# Patient Record
(demographics unavailable — no encounter records)

---

## 2025-05-24 NOTE — ASSESSMENT
[FreeTextEntry1] : Pt is a 52F w/ PMHx of HTN (non-complaint on meds) D&C, miscarriages (x2-last in 2024), presented to the ED w/ SOB for the past 2 weeks, worsening on exertion. Admitted to ICU for further management of severe symptomatic anemia, sub massive PE and DVT. Pt now home with home care. Taking Eliquis - denies any bleeding.  TCM NP reviewed that pt is under the [facility] program for home care until pt is seen by PCP. TCM NP will be assigned to sign Home Care orders post-discharge.

## 2025-05-24 NOTE — ASSESSMENT
[FreeTextEntry1] : Pt is a 52F w/ PMHx of HTN (non-complaint on meds) D&C, miscarriages (x2-last in 2024), presented to the ED w/ SOB for the past 2 weeks, worsening on exertion. Admitted to ICU for further management of severe symptomatic anemia, sub massive PE and DVT. Pt now home with home care. Taking Eliquis - denies any bleeding.  TCM NP reviewed that pt is under the [facility] program for home care until pt is seen by PCP. TCM NP will be assigned to sign Home Care orders post-discharge.

## 2025-05-24 NOTE — PLAN
[FreeTextEntry1] : 1. continue all medications as prescribed 2. maintain adequate nutrition and hydration - increase intake of green leafy vegetables 3. keep f/u with HEME/ONC, PCP 5/23; GYN, NEURO;

## 2025-05-24 NOTE — CURRENT MEDS
[Takes medication as prescribed] : takes [None] : Patient does not have any barriers to medication adherence [Yes] : Reviewed medication list for presence of high-risk medications. [Blood Thinners] : blood thinners [FreeTextEntry1] : Eliquis - denies any bleeding

## 2025-05-24 NOTE — HISTORY OF PRESENT ILLNESS
[Home] : at home, [unfilled] , at the time of the visit. [Other Location: e.g. Home (Enter Location, City,State)___] : at [unfilled] [Telehealth (audio & video)] : This visit was provided via telehealth using real-time 2-way audio visual technology. [Verbal consent obtained from patient] : the patient, [unfilled] [This encounter was initiated by telehealth (audio with video) and converted to telephone (audio only)] : This encounter was initiated by telehealth (audio with video) and converted to telephone (audio only) [Other:___] : [place] [FreeTextEntry1] : F/u post hospitalization at [facility] from for [de-identified] : This patient is Enrolled in the Post-Discharge [facility] Home Care Services Follow Up Program through [facility] for Continuity of Care S/P Recent Hospitalization until seen by PCP.  Pt is a 52F w/ PMHx of HTN (non-compliant with meds), reports a self-diagnosed hx of POTS, presented to the ED w/ SOB for the past 2 weeks, worse on exertion, with associated dizziness, and lethargy x 6 months. Pt had been attributing it to her POTS, so decided not to seek medical attention. Found to be profoundly anemic, Hgb 2.5. She had not seen her PCP recently (unsure when her last visit was). Pt endorsed hx of 2 miscarriages s/p d&c in 2004, reported last menses 2 years ago. Denied vaginal bleeding, hematuria, melena, hematochezia, hematemesis, hemoptysis. Denied family history of cancers, anemias, coagulopathies to her knowledge.  CTA revealed: extensive segmental PEs with associated mild right heart strain as well a Left femoral DVT. Incidental finding of right adnexal mass was noted.  She was admitted to ICU for further management of severe symptomatic anemia and acute PE/DVT. PESI score 72, Class II, Low Risk: 1.7-3.5% 30-day mortality in this group. PERT team consulted 5/6 and deemed no indication for thrombectomy. A formal TTE 5/6 showed EF 57%, Mildly enlarged right ventricular cavity size, with normal wall thickness, and normal right ventricular systolic function. Estimated pulmonary artery systolic pressure is 36 mmHg, consistent with borderline pulmonary hypertension. In ICU patient transfused 6 PRBC, 1FFP, 1 PLT with diuresis between products. Heme/Onc was consulted.  Hgb responded but inappropriately, peaked at 8.4 then down trended to 7.3.  Anemia work-up revealed some evidence of hemolysis with elevated bili and LDH and normal retic count.  FD AC was initiated with heparin.   Televisit converted to telephone visit due to loss of video. Pt is alert and oriented x 3. Lives alone. States she feels much better, however still fatigued. She ambulates independently indoors, has not been outside as yet. Denies CP, SOB, vision changes, nausea, vomiting, diarrhea, constipation, falls, edema. Pt reports she has all medications and is taking them. Reports appetite is improving, instructed her to have small frequent meals. 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 Mother is now in a nursing home in [facility] and has not been able to see her. She is grieving the loss of not having a family to care for her mom and also not seeing her and also her own health issues. Encouraged her to speak with her PCP for referral to counselling. Will also ask SW to provide pt with resources.  Encouraged pt to do all f/u with medical providers to get results especially for lung and breast mass. She agreed.

## 2025-05-24 NOTE — PHYSICAL EXAM
[No Acute Distress] : no acute distress [Normal Sclera/Conjunctiva] : normal sclera/conjunctiva [Normal Outer Ear/Nose] : the outer ears and nose were normal in appearance [No JVD] : no jugular venous distention [No Respiratory Distress] : no respiratory distress  [No Accessory Muscle Use] : no accessory muscle use [Normal Rate] : normal rate  [No Edema] : there was no peripheral edema [No Rash] : no rash [No Skin Lesions] : no skin lesions [Coordination Grossly Intact] : coordination grossly intact [Normal Gait] : normal gait [Normal] : affect was normal and insight and judgment were intact [de-identified] : bruising  due to IV/blood draws

## 2025-05-24 NOTE — HISTORY OF PRESENT ILLNESS
[Home] : at home, [unfilled] , at the time of the visit. [Other Location: e.g. Home (Enter Location, City,State)___] : at [unfilled] [Telehealth (audio & video)] : This visit was provided via telehealth using real-time 2-way audio visual technology. [Verbal consent obtained from patient] : the patient, [unfilled] [This encounter was initiated by telehealth (audio with video) and converted to telephone (audio only)] : This encounter was initiated by telehealth (audio with video) and converted to telephone (audio only) [Other:___] : [place] [FreeTextEntry1] : [facility] [de-identified] : This patient is Enrolled in the Post-Discharge [facility] Home Care Services Follow Up Program through [facility] for Continuity of Care S/P Recent Hospitalization until seen by PCP.  Pt is a 52F w/ PMHx of HTN (non-compliant with meds), reports a self-diagnosed hx of POTS, presented to the ED w/ SOB for the past 2 weeks, worse on exertion, with associated dizziness, and lethargy x 6 months. Pt had been attributing it to her POTS, so decided not to seek medical attention. Found to be profoundly anemic, Hgb 2.5. She had not seen her PCP recently (unsure when her last visit was). Pt endorsed hx of 2 miscarriages s/p d&c in 2004, reported last menses 2 years ago. Denied vaginal bleeding, hematuria, melena, hematochezia, hematemesis, hemoptysis. Denied family history of cancers, anemias, coagulopathies to her knowledge.  CTA revealed: extensive segmental PEs with associated mild right heart strain as well a Left femoral DVT. Incidental finding of right adnexal mass was noted.  She was admitted to ICU for further management of severe symptomatic anemia and acute PE/DVT. PESI score 72, Class II, Low Risk: 1.7-3.5% 30-day mortality in this group. PERT team consulted 5/6 and deemed no indication for thrombectomy. A formal TTE 5/6 showed EF 57%, Mildly enlarged right ventricular cavity size, with normal wall thickness, and normal right ventricular systolic function. Estimated pulmonary artery systolic pressure is 36 mmHg, consistent with borderline pulmonary hypertension. In ICU patient transfused 6 PRBC, 1FFP, 1 PLT with diuresis between products. Heme/Onc was consulted.  Hgb responded but inappropriately, peaked at 8.4 then down trended to 7.3.  Anemia work-up revealed some evidence of hemolysis with elevated bili and LDH and normal retic count.  FD AC was initiated with heparin.   Televisit converted to telephone visit due to loss of video. Pt is alert and oriented x 3. Lives alone. States she feels much better, however still fatigued. She ambulates independently indoors, has not been outside as yet. Denies CP, SOB, vision changes, nausea, vomiting, diarrhea, constipation, falls, edema. Pt reports she has all medications and is taking them. Reports appetite is improving, instructed her to have small frequent meals. 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 Mother is now in a nursing home in [facility] and has not been able to see her. She is grieving the loss of not having a family to care for her mom and also not seeing her and also her own health issues. Encouraged her to speak with her PCP for referral to counselling. Will also ask SW to provide pt with resources.  Encouraged pt to do all f/u with medical providers to get results especially for lung and breast mass. She agreed.

## 2025-05-24 NOTE — REVIEW OF SYSTEMS
[Incontinence] : incontinence [Muscle Weakness] : muscle weakness [Fever] : no fever [Chills] : no chills [Fatigue] : no fatigue [Hearing Loss] : no hearing loss [Shortness Of Breath] : no shortness of breath [Cough] : no cough [Abdominal Pain] : no abdominal pain [Nausea] : no nausea [Constipation] : no constipation [Vomiting] : no vomiting [Dysuria] : no dysuria [Hematuria] : no hematuria [Joint Pain] : no joint pain [Joint Stiffness] : no joint stiffness [Joint Swelling] : no joint swelling [Muscle Pain] : no muscle pain [Back Pain] : no back pain [Itching] : no itching [Skin Rash] : no skin rash [Unsteady Walking] : no ataxia [Insomnia] : no insomnia [Easy Bleeding] : no easy bleeding [Easy Bruising] : no easy bruising

## 2025-05-24 NOTE — PHYSICAL EXAM
[No Acute Distress] : no acute distress [Normal Sclera/Conjunctiva] : normal sclera/conjunctiva [Normal Outer Ear/Nose] : the outer ears and nose were normal in appearance [No JVD] : no jugular venous distention [No Respiratory Distress] : no respiratory distress  [No Accessory Muscle Use] : no accessory muscle use [Normal Rate] : normal rate  [No Edema] : there was no peripheral edema [No Rash] : no rash [No Skin Lesions] : no skin lesions [Coordination Grossly Intact] : coordination grossly intact [Normal Gait] : normal gait [Normal] : affect was normal and insight and judgment were intact [de-identified] : bruising  due to IV/blood draws

## 2025-06-03 NOTE — PHYSICAL EXAM
[Chaperoned Physical Exam] : A chaperone was present in the examining room during all aspects of the physical examination. [MA] : MA [Appropriately responsive] : appropriately responsive [Alert] : alert [No Acute Distress] : no acute distress [No Lymphadenopathy] : no lymphadenopathy [Regular Rate Rhythm] : regular rate rhythm [No Murmurs] : no murmurs [Clear to Auscultation B/L] : clear to auscultation bilaterally [Soft] : soft [Non-tender] : non-tender [Non-distended] : non-distended [No HSM] : No HSM [No Lesions] : no lesions [No Mass] : no mass [Oriented x3] : oriented x3 [FreeTextEntry2] : (+) B/L Ptosis [Examination Of The Breasts] : a normal appearance [No Masses] : no breast masses were palpable [Labia Majora] : normal [Labia Minora] : normal [Normal] : normal [Uterine Adnexae] : normal [FreeTextEntry1] : (+)Vaginal cyst ~ 3 cm on R-vaginal wall, mobile, non-tender, pt aware of findings, states she has had it for > 10years, asymptomatic

## 2025-06-03 NOTE — PLAN
[FreeTextEntry1] : Annual gyn exam - Pap/HPV/STD sent; Subacute Vaginitis - Aptima sent - treat based on results, pt aware; Annual screening mammogram referral given today.   (+) R-Vaginal wall cyst - asymptomatic, no surgical management needed currently.   History of ~ 2 cm R-Ovarian Cyst / 6 mm endometrial lining - asymptomatic repeat Pelvic U/S 1 year or earlier or if experiencing vaginal bleeding.   History of Bladder dysfunction - Daily Kegel's exercise reviewed / Reduce caffeine intake / regular bladder training by voiding every 2 hours counseled if symptoms worsen will refer to Urogyn for evaluation.   (+) Postmenopausal - No hx/o postmenopausal bleeding since (08/2023) / Counseling provided / Healthy dietary lifestyle modifications / Daily weight bearing exercises / Daily Yoga stretches and Mental Health exercises reviewed;  RTO for results review if abnormal or else f/u in one year.

## 2025-06-03 NOTE — HISTORY OF PRESENT ILLNESS
[Patient reported PAP Smear was normal] : Patient reported PAP Smear was normal [postmenopausal] : postmenopausal [N] : Patient does not use contraception [Y] : Positive pregnancy history [Post-Menopause, No Sxs] : post-menopausal, currently without symptoms [FreeTextEntry1] : Annual Gyn exam  Complaining of bladder dysfunction - bladder retraining reviewed, decrease caffeine intake, Kegel's  Postmenopausal since age 50.  Denies history of postmenopausal bleeding.  Last Pap> 5 years ago  Last Mammogram (~ 2 years ago); Requesting annual screening referral today.   / s/p STOP x 2   Single / Living alone / Unemployed, used to work as a teacher in [place]  Recently admitted to [facility] for management of anemia w/Hgb~2 - no vaginal bleeding, s/p Pelvic U/S - incidental finding ~ 2 cm R-simple cyst / 6mm endometrial lining / no vaginal bleeding / (+)DVT of Left Femoral vein -currently on Eliquis.      Annual gyn exam - Pap/HPV/STD sent / Pap smear up to date; STD screen - Pt requested STD blood screen today; Acute / Subacute Vaginitis - Aptima sent - treat based on results, pt aware; (+) Urinary symptoms - Urine Culture sent - treat based on results, pt aware; Annual screening Mammogram referral given or up to date; History of Irregular cycles / Pelvic pain - Hormone  panel / Health maintenance panel ordered today / Referral for Pelvic U/S given; Contraception counseling provided - Rx Depo w/ Calcium and Vitamin D daily sent to pharmacy / Rx Apri - 11 refills sent to pharmacy; History of Bladder dysfunction - Urogynecology consult referral provided today / Daily Kegel's exercise reviewed; (+) Postmenopausal - Counseling provided / Healthy dietary lifestyle modifications / Daily weight bearing exercises / Daily Yoga stretches and Mental Health exercises reviewed;  RTO for results review.  [TextBox_4] : ER follow up, cyst found on ultrasound [TextBox_19] : never done [PapSmeardate] : 2018 [TextBox_31] : done in [place] [PGHxTotal] : 2 [PGHxFullTerm] : 0 [PGHxPremature] : 0 [PGHxAbortions] : 0 [[facility]] : 0 [PGHxABInduced] : 0 [PGHxABSpont] : 2 [PGHxEctopic] : 0 [PGHxMultBirths] : 0

## 2025-06-09 NOTE — PHYSICAL EXAM
[Well Developed] : well developed [Well Nourished] : well nourished [No Acute Distress] : no acute distress [Normal Conjunctiva] : normal conjunctiva [Normal Venous Pressure] : normal venous pressure [No Carotid Bruit] : no carotid bruit [Normal S1, S2] : normal S1, S2 [No Murmur] : no murmur [No Rub] : no rub [No Gallop] : no gallop [Clear Lung Fields] : clear lung fields [Good Air Entry] : good air entry [No Respiratory Distress] : no respiratory distress  [Soft] : abdomen soft [Non Tender] : non-tender [No Masses/organomegaly] : no masses/organomegaly [Normal Bowel Sounds] : normal bowel sounds [Normal Gait] : normal gait [No Cyanosis] : no cyanosis [No Clubbing] : no clubbing [No Varicosities] : no varicosities [No Rash] : no rash [No Skin Lesions] : no skin lesions [Moves all extremities] : moves all extremities [Normal Speech] : normal speech [No Focal Deficits] : no focal deficits [Alert and Oriented] : alert and oriented [Normal memory] : normal memory

## 2025-07-02 NOTE — DISCUSSION/SUMMARY
[Antithrombotic therapy with ___] : antithrombotic therapy with  [unfilled] [Intensive Blood Pressure Control] : intensive blood pressure control [Lipid Lowering Therapy] : lipid lowering therapy [Patient encouraged to discuss with Primary MD] : I encouraged the patient to discuss these important issues with ~his/her~ primary care doctor [Goals and Counseling] : I have reviewed the goals of stroke risk factor modification. I counseled the patient on measures to reduce stroke risk, including the importance of medication compliance, risk factor control, exercise, healthy diet and avoidance of smoking. I reviewed stroke warning signs and symptoms and appropriate actions to take if such occur. [FreeTextEntry1] : From hospital summary:  Mrs. Mason is a 51 yo woman w/ PMH of HTN, Multiple Miscarriages, P/W SOB and SHARPE X 2weeks, found to be severely anemic w/Hb of 2.5, w/ evidence of numerous venous thrombosis including submassive PE, femoral DVT. Workup of psychotic presentation revealing a region of T2/FLAIR changes that are diffusion restricting and enhancing as well as punctate focus in L CR that is diff restricting but not clearly enhancing. She had since returned to baseline mental status after single day of IVMP. Suspect behavior was more likely delirium related iso anemia and metabolic derangements Patient has effectively been pan scanned and there are several nodules of unclear significance including 2.3 adnexal cystic mass w/ trace pelvic fluid, 1.3 cm NENA nodule, and 1.3 cm R thyroid lobe nodule. LP results were negative   Radiographically the differential for the brain lesions include venous infarct, watershed infarcts, small metastasis w/ surrounding vasogenic edema, and encephalitis. The largest R frontal lesion has T2 hyperintensity with irregular borders and satellite foci. It does not look very typical of encephalitis lesions which usually have softer borders, more confluent and widespread, and without such distinct satellite foci.  I would not expect patient's drastic improvement to occur after a single dose of solumedrol if this were paraneoplastic encephalitis or infectious encephalitis. Patient's MRI is motion degraded, particularly the contrast sequences.  The more likely possibilities are 1) Watershed infarcts in setting of life threatening anemia 2) Venous infarct, particularly given clinical context of multiple venous thromboses peripherally 3) small metastases with significant vasogenic edema (seems less likely but not excluded, no clear regional mass effect)  I have ordered repeat MRI head w/wo, MRA/MRV head w/wo to evaluate for infarcts vs. mets. Patient does not have significant traditional stroke risk factors and presumed infarcts related to severe anemia vs underlying hypercoagulability. Continue with AC in setting of PE and DVT as recommended by hematology. Continue with GYN follow up with for adnexal cystic mass, Pulmonary for pulm lesion, and PCP/endo for thyroid nodule. Neuro ophthalmology referral placed for evaluation chronic ptosis. She is interested in follow up with rheumatology to screen for autoimmune issues that could have caused her chronic pstosis   Signs of stroke discussed in detail    Consult note and abnormal hospital labs sent to: Dr. Chinchilla (hematology)

## 2025-07-02 NOTE — PHYSICAL EXAM
[FreeTextEntry1] : Neurological (>12):  MS: Awake, alert.  Oriented person place situation year month. Follows simple complex cross commands. Able to ID 3/3 objects. Attends to examiner Language: Speech is clear, fluent, good repetition,  comprehension, registration of words. CNs: PERRL (R 3mm, L 3mm). VFF. Significant bilateral ptosis which patient says is chronic. EOMI. No disconjugate gaze, nystagmus. V1-3 intact LT, No facial asymmetry b/l. Hearing grossly normal b/l. Tongue midline and can move side to side.   Motor - Normal bulk and tone throughout. No pronator drift.  L/R (out of 5 each)       Deltoid  5/5    Biceps   5/5      Triceps  5/5         Wrist Extension 5/5   Wrist Flexion  5/5   Interossei 5/5     5/5  L/R (out of 5 each)       Hip Flexion  5/5    Hip Extension  5/5  Knee Extension  5/5  Dorsiflexion  5/5      Plantar Flexion 5/5   Sensation: Intact to LT b/l x4 extremities.  Reflexes L/R:  intact  Toes: mute b/l no ankle clonus Coordination: No dysmetria to FTN b/l UE Gait: slow and hesitant gait

## 2025-07-02 NOTE — HISTORY OF PRESENT ILLNESS
[FreeTextEntry1] : Ms. Mason is a 52 year old right handed female PMHx HTN (non-compliant with meds), reports a self-diagnosed hx of POTS, chronic ptosis who presents today for post hospitalization follow up after a multiple infarcts on 5/5/2025  (From hospital discharge)  Pt is a 52F presented to the ED w/ SOB for the past 2 weeks, worse on exertion, with associated dizziness, and lethargy x 6 months. Pt had been attributing it to her POTS, so decided not to seek medical attention. Found to be profoundly anemic, Hgb 2.5. She had not seen her PCP recently (unsure when her last visit was). Pt endorsed hx of 2 miscarriages s/p d&c in 2004, reported last menses 2 years ago. Denied bleeding hx. Denied family history of cancers, anemias, coagulopathies to her knowledge.  CTA revealed: extensive segmental PEs with associated mild right heart strain as well a Left femoral DVT. Incidental finding of right adnexal mass was noted.  She was admitted to ICU for further management of severe symptomatic anemia and acute PE/DVT. PESI score 72, Class II, Low Risk: 1.7-3.5% 30-day mortality in this group. PERT team consulted 5/6 and deemed no indication for thrombectomy. A formal TTE 5/6 showed EF 57%. In ICU patient transfused 6 PRBC, 1FFP, 1 PLT with diuresis between products. Anemia work-up revealed some evidence of hemolysis with elevated bili and LDH and normal retic count.  FD AC was initiated with heparin.  Course was complicated by episode of severe agitation with disorganized, paranoid, delusional thoughts on the morning of 5/7.   Pt was unable to be re-directed and was medicated with Haldol. Pt continued to exhibit with disorganized, paranoid, delusional thoughts with tangential speech.  Pt's brother shared that pt has had history of eccentric behavior with emotional lability but denies any known psychiatric diagnosis. MR brain showed Right frontal lesion involving cerebral cortex and underlying white matter with enhancement consistent with encephalitis vs subacute infarction and tumor. A left mid centrum semiovale lesion consistent with acute infarction was also noted. Brain lesions likely watershed infarct iso life threatening anemia vs venous infarct iso multiple venous thromboses vs small metastasis with significant vasogenic edema. Less likely paraneoplastic encephalitis given drastic improvement in patient's "delirium" after treatment with methylprednisolone 1g qD x 3 days.  At this time, AC was changed to FD Lovenox and autoimmune encephalopathy eval and paraneoplastic antibodies were also ordered.  Given right adenexal mass, HCG and AFP tumor markers were also ordered and were negative. 24h EEG negative. Patient continued to have low hgb without any active signs of bleed and patient required an addition 2uprbc. Neurology performed an LP on 5/12 and collected: Cell count x2 in tubes 1 and protein, glucose, PCR panel (neg), Bacterial gram stain and culture (neg). Paraneoplastic encephalitis panel (ENC2), Cytology, Flow cytometry was negative. IgG index 0.6   Patient was resumed on FD Lovenox and hgb closely monitored.  GI consulted and patient underwent EGD/colonoscopy which showed erythema and ulceration in the distal sigmoid colon which was biopsied and Grade/Stage III internal and external hemorrhoids. Hypercoagulable work-up was sent, including genetic testing. Prothrombin and Factor V, and antithrombin III were negative. Hgb electrophoresis, flow cytometry, and APLS were negative. Porphyria w/u showed only slightly elevated levels, likely d/t FREDI. Patient to be transitioned to DOAC and follow outpatient with Dr. Abernathy (hematology)  An US pelvis & transvaginal confirmed a 2.1 rt ovarian cyst with mod pelvic free fluid. GYN was consulted and recommended outpatient follow up in 4-6 weeks repeat scan. For the 1.3 cm partly solid ground glass density nodule in the left upper lobe, heme onc recommended eventual follow up outpatient with repeat imaging in 3 months. Patient also to follow up outpatient for the 1.3 cm nonspecific hypodense lesion in the right lobe of the thyroid.  Course was complicated by code sepsis as patient was febrile 101.3 with . Patient had a negative lactate, UA, and clear Xray. Patient was found to be COVID positive.   7/1/2025 Today she reports feeling well and denies any residual neurological deficit. She has chronic BL ptosis x 8 years. She developed it while living in [place] and reports being tested for myasthenia gravis which was negative. Was also told it could be due to Sjogren's but was negative tested. She is on Eliquis BID for PE and DVT and being followed closely by hematology. Has started Iron supplements. Saw cardiology and PCP.

## 2025-07-02 NOTE — DISCUSSION/SUMMARY
[Antithrombotic therapy with ___] : antithrombotic therapy with  [unfilled] [Intensive Blood Pressure Control] : intensive blood pressure control [Lipid Lowering Therapy] : lipid lowering therapy [Patient encouraged to discuss with Primary MD] : I encouraged the patient to discuss these important issues with ~his/her~ primary care doctor [Goals and Counseling] : I have reviewed the goals of stroke risk factor modification. I counseled the patient on measures to reduce stroke risk, including the importance of medication compliance, risk factor control, exercise, healthy diet and avoidance of smoking. I reviewed stroke warning signs and symptoms and appropriate actions to take if such occur. [FreeTextEntry1] : From hospital summary:  Mrs. Mason is a 53 yo woman w/ PMH of HTN, Multiple Miscarriages, P/W SOB and SHARPE X 2weeks, found to be severely anemic w/Hb of 2.5, w/ evidence of numerous venous thrombosis including submassive PE, femoral DVT. Workup of psychotic presentation revealing a region of T2/FLAIR changes that are diffusion restricting and enhancing as well as punctate focus in L CR that is diff restricting but not clearly enhancing. She had since returned to baseline mental status after single day of IVMP. Suspect behavior was more likely delirium related iso anemia and metabolic derangements Patient has effectively been pan scanned and there are several nodules of unclear significance including 2.3 adnexal cystic mass w/ trace pelvic fluid, 1.3 cm NENA nodule, and 1.3 cm R thyroid lobe nodule. LP results were negative   Radiographically the differential for the brain lesions include venous infarct, watershed infarcts, small metastasis w/ surrounding vasogenic edema, and encephalitis. The largest R frontal lesion has T2 hyperintensity with irregular borders and satellite foci. It does not look very typical of encephalitis lesions which usually have softer borders, more confluent and widespread, and without such distinct satellite foci.  I would not expect patient's drastic improvement to occur after a single dose of solumedrol if this were paraneoplastic encephalitis or infectious encephalitis. Patient's MRI is motion degraded, particularly the contrast sequences.  The more likely possibilities are 1) Watershed infarcts in setting of life threatening anemia 2) Venous infarct, particularly given clinical context of multiple venous thromboses peripherally 3) small metastases with significant vasogenic edema (seems less likely but not excluded, no clear regional mass effect)  I have ordered repeat MRI head w/wo, MRA/MRV head w/wo to evaluate for infarcts vs. mets. Patient does not have significant traditional stroke risk factors and presumed infarcts related to severe anemia vs underlying hypercoagulability. Continue with AC in setting of PE and DVT as recommended by hematology. Continue with GYN follow up with for adnexal cystic mass, Pulmonary for pulm lesion, and PCP/endo for thyroid nodule. Neuro ophthalmology referral placed for evaluation chronic ptosis. She is interested in follow up with rheumatology to screen for autoimmune issues that could have caused her chronic pstosis   Signs of stroke discussed in detail    Consult note and abnormal hospital labs sent to: Dr. Chinchilla (hematology)

## 2025-07-03 NOTE — HISTORY OF PRESENT ILLNESS
[FreeTextEntry1] : Pt has been stable since last visit. She denies CP, SOB, palpitation, dizziness or LOC. She has trace LE swelling. She states her home BPs have been 130s/90s. She recently increased Losartan from 25 to 50mg daily. She is awaiting repeat brain MRI with neurology.  6/9/25 - Pt last seen hematology 6/6/25. Pt was referred to cardiology for consideration of ILR and TTE with bubble study given possible ?stroke seen on brain MRI. Based on neuro consult in hospitalization, the location of infarcts do not appear related to arterial thrombosis. There are plans to repeat brain imaging as outpatient given the degree of motion artifact. Her EKG in the hospital showed sinus rhythm with non-specific T wave abnormality. Since discharge, pt reports feeling better. She is undergoing PT/OT at home. She denies CP, SOB, palpitation, dizziness or LOC. She reports trace L greater than RLE edema which has been similar since hospitalization. No orthopnea or PND. She remains on Eliquis for PE and DVT without bleeding issues.  Brain MRI during hospitlaization 1. Right frontal lesion involving cerebral cortex and underlying white matter with enhancement. Imaging appearance is nonspecific but consistent with the clinical diagnosis of encephalitis. Other etiologies including subacute infarction and tumor are felt to be less likely  2. Right parietal lesion is without enhancement and indeterminate, possibly artifactual  3. Left mid centrum semiovale lesion is most consistent with acute infarction  4. Patient motion limited scan. Recommend reevaluation once tolerated by the patient.  --- End of Report ---   GLORIA BOWER MD; Attending Radiologist This document has been electronically signed. May 8 2025 3:00PM

## 2025-07-03 NOTE — PHYSICAL EXAM

## 2025-07-03 NOTE — REASON FOR VISIT
[CV Risk Factors and Non-Cardiac Disease] : CV risk factors and non-cardiac disease [Hypertension] : hypertension [FreeTextEntry1] : 52 year old female with HTN, recent admission to [facility] 5/5-5/18/25 found to have multilobar PE, L common femoral DVT, and symptomatic anemia (Hgb 2.5) who presents for cardiac evaluation.  Meds: Apixaban 5mg BID, Losartan 25mg --> 50mg daily

## 2025-07-03 NOTE — ASSESSMENT
[FreeTextEntry1] : 52 year old female with HTN, recent admission to [facility] 5/5-5/18/25 found to have multilobar PE, L common femoral DVT, and symptomatic anemia (Hgb 2.5) who presents for cardiac evaluation.  1) PE 2) L common femoral DVT - No evidence of right heart strain on TTE. Repeat TTE 6/9/25 showed hyperdynamic LVEF 70-75%, mild LVH, normal RV size/function, mild TR, and normal pulmonary pressures - Continue Eliquis 5mg BID, f/u with hematology - Monitor closely for signs/symptoms of bleeding  3) ?CVA vs. encephalitis - Based on review of neuro consultation note in hospital, MRI findings are less likely related to arterial thrombosis - F/U neurology for further work-up and repeat MRI - F/U 1 week Holter to r/o occult AFIB - In light of DVT, Bubble Study was done 6/9/25 as requested by hematology, which was negative for PFO.  4) HTN, - Continue Losartan 50mg daily - Monitor BPs at home  5) Follow-up, 3 months